# Patient Record
Sex: MALE | URBAN - METROPOLITAN AREA
[De-identification: names, ages, dates, MRNs, and addresses within clinical notes are randomized per-mention and may not be internally consistent; named-entity substitution may affect disease eponyms.]

---

## 2023-02-23 ENCOUNTER — OFFICE VISIT (OUTPATIENT)
Dept: URBAN - METROPOLITAN AREA CLINIC 80 | Facility: CLINIC | Age: 43
End: 2023-02-23

## 2024-09-24 ENCOUNTER — OFFICE VISIT (OUTPATIENT)
Dept: URBAN - METROPOLITAN AREA CLINIC 128 | Facility: CLINIC | Age: 44
End: 2024-09-24
Payer: COMMERCIAL

## 2024-09-24 ENCOUNTER — DASHBOARD ENCOUNTERS (OUTPATIENT)
Age: 44
End: 2024-09-24

## 2024-09-24 ENCOUNTER — LAB OUTSIDE AN ENCOUNTER (OUTPATIENT)
Dept: URBAN - METROPOLITAN AREA CLINIC 128 | Facility: CLINIC | Age: 44
End: 2024-09-24

## 2024-09-24 VITALS
DIASTOLIC BLOOD PRESSURE: 80 MMHG | SYSTOLIC BLOOD PRESSURE: 142 MMHG | WEIGHT: 245 LBS | HEIGHT: 76 IN | HEART RATE: 89 BPM | TEMPERATURE: 98.1 F | BODY MASS INDEX: 29.83 KG/M2

## 2024-09-24 DIAGNOSIS — R10.13 EPIGASTRIC PAIN: ICD-10-CM

## 2024-09-24 DIAGNOSIS — R19.7 CHRONIC DIARRHEA: ICD-10-CM

## 2024-09-24 DIAGNOSIS — R10.813 RIGHT LOWER QUADRANT ABDOMINAL TENDERNESS WITHOUT REBOUND TENDERNESS: ICD-10-CM

## 2024-09-24 DIAGNOSIS — R10.816 EPIGASTRIC ABDOMINAL TENDERNESS WITHOUT REBOUND TENDERNESS: ICD-10-CM

## 2024-09-24 DIAGNOSIS — Z98.890 STATUS POST NISSEN FUNDOPLICATION: ICD-10-CM

## 2024-09-24 PROCEDURE — 99205 OFFICE O/P NEW HI 60 MIN: CPT | Performed by: INTERNAL MEDICINE

## 2024-09-24 RX ORDER — RIVAROXABAN 20 MG/1
1 TABLET WITH FOOD TABLET, FILM COATED ORAL ONCE A DAY
Status: ACTIVE | COMMUNITY

## 2024-09-24 RX ORDER — SUCRALFATE 1 G/10ML
10 ML 1 HOUR BEFORE MEALS AND AT BEDTIME ON AN EMPTY STOMACH SUSPENSION ORAL
Status: ACTIVE | COMMUNITY

## 2024-09-24 RX ORDER — POLYETHYLENE GLYCOL 3350, SODIUM SULFATE ANHYDROUS, SODIUM BICARBONATE, SODIUM CHLORIDE, POTASSIUM CHLORIDE 236; 22.74; 6.74; 5.86; 2.97 G/4L; G/4L; G/4L; G/4L; G/4L
4000ML POWDER, FOR SOLUTION ORAL ONCE
Qty: 4000 ML | Refills: 0 | OUTPATIENT
Start: 2024-09-24 | End: 2024-09-25

## 2024-09-24 RX ORDER — FLECAINIDE ACETATE 100 MG/1
0.5 TABLET TABLET ORAL
Status: ACTIVE | COMMUNITY

## 2024-09-24 NOTE — HPI-TODAY'S VISIT:
Pleasant 44yo gentleman with hx of chronic GERD with lap Nissen in 2021 followed by severe gas bloat syndrome.  Evaluation with gastric emptying scan and reflux/motility analysis of the esophagus by Dr. Reveles in 2022 revealed no obvious abnormality.  Because of progressive persistent symptoms original surgeon reoperated with conversion to a Toupet procedure(fundoplication was not taken down) in 2023.  Symptoms of bloating improved paritally as patient was able to belch again following the second procedure but he developed progressive difficulty with epigastiric pain that can sometimes radiate laterally and inferiorly.  EGD report reviewed from 05/2024 suggesting a paraesophageal component to hiatal hernia repair and lower position of the hiatus.  Stomach and small bowel otherwise normal.  CT Scan recently identified distended stomach with air and fluid -- otherwise normal.  There is a constant sense of pressure in the upper mid abdomen.  The episodes of discomfort are castrejon and severe and not clearly brought on by eating or fasting.  Relief can be assoc with burping or passing flatus/stool. Bowel movements have changed to persistent loose stools 1-3 in the morning and usually more after midday and evening meals.  No overt bleeding. Some episodic mucus.  Weight stable.  Prior to illness BMs were fromed once a day with no issues.  No increased stress or anxiety except from illness.  There is hx of atrial fibrillation that is sporadic and seems to occur more since surgery.  He has spells of pain that can make him feel he is going to pass out.  He treats the atrial fibrillation when episodes occur with metoprolol and flecanide, and also takes xarelto around these exacerbations.

## 2024-09-24 NOTE — PHYSICAL EXAM GASTROINTESTINAL
Abdomen soft, tender epigastric and supraumbilical area -- some focal to port site scars.  tender medial RUQ and LUQ as well, also tender in RLQ and LLQ, nondistended, no masses palpable , normal bowel sounds   Liver and Spleen , no hepatomegaly present, liver edge nontender , spleen not palpable   Rectal: deferred

## 2024-10-07 ENCOUNTER — LAB OUTSIDE AN ENCOUNTER (OUTPATIENT)
Dept: URBAN - METROPOLITAN AREA CLINIC 128 | Facility: CLINIC | Age: 44
End: 2024-10-07

## 2024-10-23 ENCOUNTER — OFFICE VISIT (OUTPATIENT)
Dept: URBAN - METROPOLITAN AREA CLINIC 128 | Facility: CLINIC | Age: 44
End: 2024-10-23